# Patient Record
Sex: FEMALE | Race: WHITE | URBAN - METROPOLITAN AREA
[De-identification: names, ages, dates, MRNs, and addresses within clinical notes are randomized per-mention and may not be internally consistent; named-entity substitution may affect disease eponyms.]

---

## 2017-11-16 ENCOUNTER — OFFICE VISIT (OUTPATIENT)
Dept: PEDIATRICS | Facility: CLINIC | Age: 22
End: 2017-11-16
Payer: COMMERCIAL

## 2017-11-16 VITALS
OXYGEN SATURATION: 98 % | TEMPERATURE: 98.7 F | HEART RATE: 94 BPM | DIASTOLIC BLOOD PRESSURE: 72 MMHG | HEIGHT: 67 IN | BODY MASS INDEX: 28.56 KG/M2 | SYSTOLIC BLOOD PRESSURE: 116 MMHG | WEIGHT: 182 LBS

## 2017-11-16 DIAGNOSIS — Z23 NEED FOR PROPHYLACTIC VACCINATION WITH TETANUS-DIPHTHERIA (TD): ICD-10-CM

## 2017-11-16 DIAGNOSIS — Z23 NEED FOR PROPHYLACTIC VACCINATION AND INOCULATION AGAINST INFLUENZA: ICD-10-CM

## 2017-11-16 DIAGNOSIS — F41.1 GAD (GENERALIZED ANXIETY DISORDER): Primary | ICD-10-CM

## 2017-11-16 DIAGNOSIS — Z30.41 ENCOUNTER FOR BIRTH CONTROL PILLS MAINTENANCE: ICD-10-CM

## 2017-11-16 PROCEDURE — 90472 IMMUNIZATION ADMIN EACH ADD: CPT | Performed by: STUDENT IN AN ORGANIZED HEALTH CARE EDUCATION/TRAINING PROGRAM

## 2017-11-16 PROCEDURE — 90715 TDAP VACCINE 7 YRS/> IM: CPT | Performed by: STUDENT IN AN ORGANIZED HEALTH CARE EDUCATION/TRAINING PROGRAM

## 2017-11-16 PROCEDURE — 90471 IMMUNIZATION ADMIN: CPT | Performed by: STUDENT IN AN ORGANIZED HEALTH CARE EDUCATION/TRAINING PROGRAM

## 2017-11-16 PROCEDURE — 99214 OFFICE O/P EST MOD 30 MIN: CPT | Mod: GC | Performed by: STUDENT IN AN ORGANIZED HEALTH CARE EDUCATION/TRAINING PROGRAM

## 2017-11-16 PROCEDURE — 90686 IIV4 VACC NO PRSV 0.5 ML IM: CPT | Performed by: STUDENT IN AN ORGANIZED HEALTH CARE EDUCATION/TRAINING PROGRAM

## 2017-11-16 RX ORDER — LEVONORGESTREL/ETHIN.ESTRADIOL 0.1-0.02MG
1 TABLET ORAL DAILY
COMMUNITY

## 2017-11-16 RX ORDER — PAROXETINE 20 MG/1
20 TABLET, FILM COATED ORAL AT BEDTIME
Qty: 30 TABLET | Refills: 5 | Status: SHIPPED | OUTPATIENT
Start: 2017-11-16 | End: 2018-03-29

## 2017-11-16 RX ORDER — PAROXETINE 20 MG/1
20 TABLET, FILM COATED ORAL AT BEDTIME
COMMUNITY
End: 2017-11-16

## 2017-11-16 ASSESSMENT — PATIENT HEALTH QUESTIONNAIRE - PHQ9: SUM OF ALL RESPONSES TO PHQ QUESTIONS 1-9: 7

## 2017-11-16 ASSESSMENT — ANXIETY QUESTIONNAIRES
7. FEELING AFRAID AS IF SOMETHING AWFUL MIGHT HAPPEN: MORE THAN HALF THE DAYS
5. BEING SO RESTLESS THAT IT IS HARD TO SIT STILL: NOT AT ALL
IF YOU CHECKED OFF ANY PROBLEMS ON THIS QUESTIONNAIRE, HOW DIFFICULT HAVE THESE PROBLEMS MADE IT FOR YOU TO DO YOUR WORK, TAKE CARE OF THINGS AT HOME, OR GET ALONG WITH OTHER PEOPLE: SOMEWHAT DIFFICULT
3. WORRYING TOO MUCH ABOUT DIFFERENT THINGS: SEVERAL DAYS
GAD7 TOTAL SCORE: 7
4. TROUBLE RELAXING: NOT AT ALL
2. NOT BEING ABLE TO STOP OR CONTROL WORRYING: SEVERAL DAYS
1. FEELING NERVOUS, ANXIOUS, OR ON EDGE: SEVERAL DAYS
6. BECOMING EASILY ANNOYED OR IRRITABLE: MORE THAN HALF THE DAYS

## 2017-11-16 NOTE — PATIENT INSTRUCTIONS
Thank you for coming to clinic today. It was a pleasure to see you and I would be happy to see you back at any time for follow up or for new health issues.    1. I refilled your Paxil today. I would love to see you back in 6 months to reevaluate how the medication is working for you.  2. Make an appointment with Maribel Bailey for IUD placement and a pap smear. She's great!  3. Vaccines: Tdap and flu shots today.  4. Return to clinic in 6 months. We can do your physical exam then.     Preventive Health Recommendations  Female Ages 18 to 25     Yearly exam:     See your health care provider every year in order to  o Review health changes.   o Discuss preventive care.    o Review your medicines if your doctor has prescribed any.      You should be tested each year for STDs (sexually transmitted diseases).       After age 20, talk to your provider about how often you should have cholesterol testing.      Starting at age 21, get a Pap test every three years. If you have an abnormal result, your doctor may have you test more often.      If you are at risk for diabetes, you should have a diabetes test (fasting glucose).     Shots:     Get a flu shot each year.     Get a tetanus shot every 10 years.     Consider getting the shot (vaccine) that prevents cervical cancer (Gardasil).    Nutrition:     Eat at least 5 servings of fruits and vegetables each day.    Eat whole-grain bread, whole-wheat pasta and brown rice instead of white grains and rice.    Talk to your provider about Calcium and Vitamin D.     Lifestyle    Exercise at least 150 minutes a week each week (30 minutes a day, 5 days a week). This will help you control your weight and prevent disease.    Limit alcohol to one drink per day.    No smoking.     Wear sunscreen to prevent skin cancer.    See your dentist every six months for an exam and cleaning.

## 2017-11-16 NOTE — MR AVS SNAPSHOT
After Visit Summary   11/16/2017    Jeri Ray    MRN: [id number]           Patient Information     Date Of Birth          1995        Visit Information        Provider Department      11/16/2017 1:30 PM Trenton Cross MD [facility]        Today's Diagnoses     LENNY (generalized anxiety disorder)    -  1    Screening examination for venereal disease        Screening for malignant neoplasm of cervix        Need for HPV vaccine        Need for prophylactic vaccination and inoculation against influenza        Need for prophylactic vaccination with tetanus-diphtheria (TD)          Care Instructions    Thank you for coming to clinic today. It was a pleasure to see you and I would be happy to see you back at any time for follow up or for new health issues.    1. I refilled your Paxil today. I would love to see you back in 6 months to reevaluate how the medication is working for you.  2. Make an appointment with Maribel Bailey for IUD placement and a pap smear. She's great!  3. Vaccines: Tdap and flu shots today.  4. Return to clinic in 6 months. We can do your physical exam then.     Preventive Health Recommendations  Female Ages 18 to 25     Yearly exam:     See your health care provider every year in order to  o Review health changes.   o Discuss preventive care.    o Review your medicines if your doctor has prescribed any.      You should be tested each year for STDs (sexually transmitted diseases).       After age 20, talk to your provider about how often you should have cholesterol testing.      Starting at age 21, get a Pap test every three years. If you have an abnormal result, your doctor may have you test more often.      If you are at risk for diabetes, you should have a diabetes test (fasting glucose).     Shots:     Get a flu shot each year.     Get a tetanus shot every 10 years.     Consider getting the shot (vaccine) that prevents cervical cancer  "(Gardasil).    Nutrition:     Eat at least 5 servings of fruits and vegetables each day.    Eat whole-grain bread, whole-wheat pasta and brown rice instead of white grains and rice.    Talk to your provider about Calcium and Vitamin D.     Lifestyle    Exercise at least 150 minutes a week each week (30 minutes a day, 5 days a week). This will help you control your weight and prevent disease.    Limit alcohol to one drink per day.    No smoking.     Wear sunscreen to prevent skin cancer.    See your dentist every six months for an exam and cleaning.          Follow-ups after your visit        Who to contact     If you have questions or need follow up information about today's clinic visit or your schedule please contact [facility] directly at [phone].  Normal or non-critical lab and imaging results will be communicated to you by MyChart, letter or phone within 4 business days after the clinic has received the results. If you do not hear from us within 7 days, please contact the clinic through [employer] or phone. If you have a critical or abnormal lab result, we will notify you by phone as soon as possible.  Submit refill requests through [employer] or call your pharmacy and they will forward the refill request to us. Please allow 3 business days for your refill to be completed.          Additional Information About Your Visit        [employer] Information     [employer] lets you send messages to your doctor, view your test results, renew your prescriptions, schedule appointments and more. To sign up, go to [email or web address] . Click on \"Log in\" on the left side of the screen, which will take you to the Welcome page. Then click on \"Sign up Now\" on the right side of the page.     You will be asked to enter the access code listed below, as well as some personal information. Please follow the directions to create your username and password.     Your access code is: [email or web address]  Expires: 2/14/2018  2:22 PM   " "  Your access code will  in 90 days. If you need help or a new code, please call your [place] clinic or [phone].        Care EveryWhere ID     This is your Care EveryWhere ID. This could be used by other organizations to access your [place] medical records  [id number]        Your Vitals Were     Pulse Temperature Height Last Period Pulse Oximetry Breastfeeding?    94 98.7  F (37.1  C) (Oral) 5' 7\" (1.702 m) 2017 (Approximate) 98% No    BMI (Body Mass Index)                   28.51 kg/m2            Blood Pressure from Last 3 Encounters:   17 116/72    Weight from Last 3 Encounters:   17 182 lb (82.6 kg)              Today, you had the following     No orders found for display         Where to get your medicines      These medications were sent to [facility], [place], [facility], [place] - [address]  [address], [facility] [address]     Phone:  [phone]     PARoxetine 20 MG tablet          Primary Care Provider    Physician No Ref-Primary       NO REF-PRIMARY PHYSICIAN        Equal Access to Services     [facility] : Hadii aad ku hadasho Soomaali, waaxda luqadaha, qaybta kaalmada ting, isaura gambino . So [facility] [phone].    ATENCIÓN: Si habla español, tiene a ann disposición servicios gratuitos de asistencia lingüística. [facility] [phone].    We comply with applicable federal civil rights laws and [place] laws. We do not discriminate on the basis of race, color, national origin, age, disability, sex, sexual orientation, or gender identity.            Thank you!     Thank you for choosing [facility]  for your care. Our goal is always to provide you with excellent care. Hearing back from our patients is one way we can continue to improve our services. Please take a few minutes to complete the written survey that you may receive in the mail after your visit with us. Thank you!   "           Your Updated Medication List - Protect others around you: Learn how to safely use, store and throw away your medicines at www.disposemymeds.org.          This list is accurate as of: 11/16/17  2:22 PM.  Always use your most recent med list.                   Brand Name Dispense Instructions for use Diagnosis    LUTERA 0.1-20 MG-MCG per tablet   Generic drug:  levonorgestrel-ethinyl estradiol      Take 1 tablet by mouth daily        PARoxetine 20 MG tablet    PAXIL    30 tablet    Take 1 tablet (20 mg) by mouth At Bedtime    LENNY (generalized anxiety disorder)

## 2017-11-16 NOTE — NURSING NOTE
Injectable Influenza Immunization Documentation    1.  Is the person to be vaccinated sick today? No    2. Does the person to be vaccinated have an allergy to a component   of the vaccine? No    3. Has the person to be vaccinated ever had a serious reaction   to influenza vaccine in the past? No    4. Has the person to be vaccinated ever had Guillain-Barré syndrome? No    Form completed by Kezia WITT MA    Screening Questionnaire for Adult Immunization    Are you sick today?   No   Do you have allergies to medications, food, a vaccine component or latex?   No   Have you ever had a serious reaction after receiving a vaccination?   No   Do you have a long-term health problem with heart disease, lung disease, asthma, kidney disease, metabolic disease (e.g. diabetes), anemia, or other blood disorder?   No   Do you have cancer, leukemia, HIV/AIDS, or any other immune system problem?   No   In the past 3 months, have you taken medications that affect  your immune system, such as prednisone, other steroids, or anticancer drugs; drugs for the treatment of rheumatoid arthritis, Crohn s disease, or psoriasis; or have you had radiation treatments?   No   Have you had a seizure, or a brain or other nervous system problem?   No   During the past year, have you received a transfusion of blood or blood     products, or been given immune (gamma) globulin or antiviral drug?   No   For women: Are you pregnant or is there a chance you could become        pregnant during the next month?   No   Have you received any vaccinations in the past 4 weeks?   No     Immunization questionnaire answers were all negative.        Per orders of Dr. Cross/Dr. Sosa, injection of Adacel and Flu given by Kezia Garcia. Pt tolerated well. Patient instructed to remain in clinic for 15 minutes afterwards, and to report any adverse reaction to me immediately.       Screening performed by Kezia Garcia on 11/16/2017 at 3:01 PM.

## 2017-11-16 NOTE — PROGRESS NOTES
SUBJECTIVE:   CC: eJri Ray is an 22 year old woman who presents to establish care and for medication refills.     HPI  Moved from [place], [place] a year ago for work. Here to establish care today.    1. Anxiety: On Paxil 20 mg daily for last year and feels it is working well. LENNY score is 7, PHQ score is 7 today. No previous to compare to in our system. No side effects from Paxil. No issues with breakthrough anxiety. Current stressors include new job and starting school again. Was seeing a counselor several years ago but did not feel it was helpful and is not interested in starting therapy again.    2. Birth control: On Lutera for last year. She is interested in getting an IUD. Sexually active with same male partner, with whom she moved to [place]. No concerns for STDs. They are monogamous. They do not use additional birth control. She has regular periods, lasting about 7 days. Heavy for 2 days. Uses 2-3 super tampons on heaviest day.     3. Immunizations: Needs Tdap and flu shots today. Thinks she had the HPV series.     PHQ-9 score:    PHQ-9 SCORE 11/16/2017   Total Score 7     LENNY-7 SCORE 11/16/2017   Total Score 7     Abuse: Current or Past(Physical, Sexual or Emotional)- No  Do you feel safe in your environment - Yes    Social History   Substance Use Topics     Smoking status: Never Smoker     Smokeless tobacco: Never Used     Alcohol use Yes      Comment: a few drinks twice per month     The patient does not drink >3 drinks per day nor >7 drinks per week.    Reviewed orders with patient.  Reviewed health maintenance and updated orders accordingly - Yes  BP Readings from Last 3 Encounters:   11/16/17 116/72    Wt Readings from Last 3 Encounters:   11/16/17 182 lb (82.6 kg)                  Patient Active Problem List   Diagnosis     LENNY (generalized anxiety disorder)     Past Surgical History:   Procedure Laterality Date     HC TOOTH EXTRACTION W/FORCEP         Social History   Substance Use Topics      "Smoking status: Never Smoker     Smokeless tobacco: Never Used     Alcohol use Yes      Comment: a few drinks twice per month     Family History   Problem Relation Age of Onset     Hypertension Mother      Breast Cancer Maternal Grandmother 50     Breast Cancer Maternal Aunt 40         Current Outpatient Prescriptions   Medication Sig Dispense Refill     levonorgestrel-ethinyl estradiol (LUTERA) 0.1-20 MG-MCG per tablet Take 1 tablet by mouth daily       PARoxetine (PAXIL) 20 MG tablet Take 1 tablet (20 mg) by mouth At Bedtime 30 tablet 5     [DISCONTINUED] PARoxetine (PAXIL) 20 MG tablet Take 20 mg by mouth At Bedtime        No Known Allergies        Mammogram not appropriate for this patient based on age.    Pertinent mammograms are reviewed under the imaging tab.  History of abnormal Pap smear: NO - age 21-29 PAP every 3 years recommended  Just turned age 22, has not had a pap smear yet.     Reviewed and updated as needed this visit by clinical staffTobacco  Allergies  Meds  Problems  Med Hx  Surg Hx  Fam Hx  Soc Hx        Review of Systems  C: NEGATIVE for fever, chills, change in weight  I: NEGATIVE for worrisome rashes, moles or lesions  E: NEGATIVE for vision changes or irritation  ENT: NEGATIVE for ear, mouth and throat problems  R: NEGATIVE for significant cough or SOB  B: NEGATIVE for masses, tenderness or discharge  CV: NEGATIVE for chest pain, palpitations or peripheral edema  GI: NEGATIVE for nausea, abdominal pain, heartburn, or change in bowel habits  : NEGATIVE for unusual urinary or vaginal symptoms. Periods are regular.  M: NEGATIVE for significant arthralgias or myalgia  N: NEGATIVE for weakness, dizziness or paresthesias  P: NEGATIVE for changes in mood or affect     OBJECTIVE:   /72 (BP Location: Right arm, Cuff Size: Adult Regular)  Pulse 94  Temp 98.7  F (37.1  C) (Oral)  Ht 5' 7\" (1.702 m)  Wt 182 lb (82.6 kg)  LMP 11/02/2017 (Approximate)  SpO2 98%  Breastfeeding? No  " BMI 28.51 kg/m2  Physical Exam  Physical Exam     General: awake, alert, in no acute distress  HEENT: NCAT, PERRL, EOMI, sclera anicteric, no nasal discharge, MMM, posterior pharynx without erythema or exudates, no cervical lymphadenopathy  CV: RRR, no murmurs noted, peripheral pulses strong  Resp: CTAB, no increased WOB  Abd: Soft, nontender, nondistended, +BS, no rebound or guarding  MSK: No peripheral edema, extremities warm and well perfused, normal pulses  Skin: warm, dry, no jaundice  Neuro: CN II-XII grossly intact. No focal deficits. Alert and oriented x4.      ASSESSMENT/PLAN:   1. LENNY (generalized anxiety disorder)  PHQ and LENNY scores of 7 today. Stable on Paxil for last year per patient, with no side effects.   - PARoxetine (PAXIL) 20 MG tablet; Take 1 tablet (20 mg) by mouth At Bedtime  Dispense: 30 tablet; Refill: 5  - Should RTC in 6 months for medication evaluation.    2. Encounter for birth control pills maintenance  Currently on Lutera. She is interested in getting an IUD.   - Referred to Maribel Bailey for IUD placement.  - Should have her first pap smear at the same time.  - Not interested in STI screening at this time. Low risk.    3. Need for prophylactic vaccination and inoculation against influenza  - Vaccine Administration, Initial [61782]  - FLU VAC, SPLIT VIRUS IM > 3 YO (QUADRIVALENT) [22544]    4. Need for prophylactic vaccination with tetanus-diphtheria (TD)  - Vaccine Administration, Each Additional [66298]  - TDAP VACCINE (ADACEL)    Patient was seen and discussed with attending, Dr. Reji Sosa, who agrees with the above assessment and plan.    Rajwinder Cross MD  PGY - 2   Internal Medicine/Pediatrics  Pager [phone]  [facility]    I have seen this patient and examined him in the presence of Dr. Cross.  I was present during the key components of the presenting complaints, physical exam, diagnosis, and plan, and fully concur with the plan as listed in the  resident's note.

## 2017-11-16 NOTE — NURSING NOTE
"Chief Complaint   Patient presents with     Establish Care     Physical       Initial /72 (BP Location: Right arm, Cuff Size: Adult Regular)  Pulse 94  Temp 98.7  F (37.1  C) (Oral)  Ht 5' 7\" (1.702 m)  Wt 182 lb (82.6 kg)  SpO2 98%  Breastfeeding? No  BMI 28.51 kg/m2 Estimated body mass index is 28.51 kg/(m^2) as calculated from the following:    Height as of this encounter: 5' 7\" (1.702 m).    Weight as of this encounter: 182 lb (82.6 kg).  Medication Reconciliation: complete     Kezia Garcia MA   November 16, 2017,  1:51 PM    "

## 2017-11-17 ASSESSMENT — ANXIETY QUESTIONNAIRES: GAD7 TOTAL SCORE: 7

## 2018-01-28 ENCOUNTER — HEALTH MAINTENANCE LETTER (OUTPATIENT)
Age: 23
End: 2018-01-28

## 2018-03-27 ENCOUNTER — OFFICE VISIT (OUTPATIENT)
Dept: PEDIATRICS | Facility: CLINIC | Age: 23
End: 2018-03-27
Payer: COMMERCIAL

## 2018-03-27 VITALS
OXYGEN SATURATION: 96 % | HEART RATE: 97 BPM | BODY MASS INDEX: 31.23 KG/M2 | TEMPERATURE: 98.4 F | SYSTOLIC BLOOD PRESSURE: 126 MMHG | DIASTOLIC BLOOD PRESSURE: 80 MMHG | WEIGHT: 199 LBS | HEIGHT: 67 IN

## 2018-03-27 DIAGNOSIS — S81.802A WOUND OF LEFT LOWER EXTREMITY, INITIAL ENCOUNTER: Primary | ICD-10-CM

## 2018-03-27 PROCEDURE — 99214 OFFICE O/P EST MOD 30 MIN: CPT | Performed by: PHYSICIAN ASSISTANT

## 2018-03-27 PROCEDURE — 87070 CULTURE OTHR SPECIMN AEROBIC: CPT | Performed by: PHYSICIAN ASSISTANT

## 2018-03-27 PROCEDURE — 87077 CULTURE AEROBIC IDENTIFY: CPT | Performed by: PHYSICIAN ASSISTANT

## 2018-03-27 RX ORDER — SULFAMETHOXAZOLE/TRIMETHOPRIM 800-160 MG
1 TABLET ORAL 2 TIMES DAILY
Qty: 20 TABLET | Refills: 0 | Status: SHIPPED | OUTPATIENT
Start: 2018-03-27 | End: 2018-07-03

## 2018-03-27 NOTE — MR AVS SNAPSHOT
"              After Visit Summary   3/27/2018    Jeri Ray    MRN: [id number]           Patient Information     Date Of Birth          1995        Visit Information        Provider Department      3/27/2018 1:10 PM Julio Menendez PA-C [facility]        Today's Diagnoses     Wound of left lower extremity, initial encounter    -  1      Care Instructions    1. Heat at the site 10-15 min three times daily   2. Begin antibiotics   3. Keep covered   4. Return in two days--Dr. Purdy 330 pm on THU             Follow-ups after your visit        Who to contact     If you have questions or need follow up information about today's clinic visit or your schedule please contact [facility] directly at [phone].  Normal or non-critical lab and imaging results will be communicated to you by [employer], letter or phone within 4 business days after the clinic has received the results. If you do not hear from us within 7 days, please contact the clinic through [employer] or phone. If you have a critical or abnormal lab result, we will notify you by phone as soon as possible.  Submit refill requests through [employer] or call your pharmacy and they will forward the refill request to us. Please allow 3 business days for your refill to be completed.          Additional Information About Your Visit        [employer] Information     [employer] lets you send messages to your doctor, view your test results, renew your prescriptions, schedule appointments and more. To sign up, go to [email or web address] . Click on \"Log in\" on the left side of the screen, which will take you to the Welcome page. Then click on \"Sign up Now\" on the right side of the page.     You will be asked to enter the access code listed below, as well as some personal information. Please follow the directions to create your username and password.     Your access code is: [email or web address]  Expires: 6/25/2018  1:39 PM     Your access code will " " in 90 days. If you need help or a new code, please call your [place] clinic or [phone].        Care EveryWhere ID     This is your Care EveryWhere ID. This could be used by other organizations to access your [place] medical records  [id number]        Your Vitals Were     Pulse Temperature Height Pulse Oximetry BMI (Body Mass Index)       97 98.4  F (36.9  C) (Oral) 5' 7\" (1.702 m) 96% 31.17 kg/m2        Blood Pressure from Last 3 Encounters:   18 126/80   17 116/72    Weight from Last 3 Encounters:   18 199 lb (90.3 kg)   17 182 lb (82.6 kg)              We Performed the Following     Wound Culture Aerobic Bacterial          Today's Medication Changes          These changes are accurate as of 3/27/18  1:40 PM.  If you have any questions, ask your nurse or doctor.               Start taking these medicines.        Dose/Directions    sulfamethoxazole-trimethoprim 800-160 MG per tablet   Commonly known as:  BACTRIM DS/SEPTRA DS   Used for:  Wound of left lower extremity, initial encounter   Started by:  Julio Menendez PA-C        Dose:  1 tablet   Take 1 tablet by mouth 2 times daily   Quantity:  20 tablet   Refills:  0            Where to get your medicines      [facility] [place] [facility] [place], [place] - [address]  [address], [facility] [address]     Phone:  [phone]     sulfamethoxazole-trimethoprim 800-160 MG per tablet                Primary Care Provider Office Phone # Fax #    Enmo Danuta Cross [phone]       [address]  [facility] [address]        Equal Access to Services     FRANCESCA YANES AH: Rosario Vasquez, giana adams, norman maguire, isaura Naqvi [facility] [phone].    ATENCIÓN: Si habla español, tiene a ann disposición servicios gratuitos de asistencia lingüística. " Sarai loco [phone].    We comply with applicable federal civil rights laws and [place] laws. We do not discriminate on the basis of race, color, national origin, age, disability, sex, sexual orientation, or gender identity.            Thank you!     Thank you for choosing [facility]  for your care. Our goal is always to provide you with excellent care. Hearing back from our patients is one way we can continue to improve our services. Please take a few minutes to complete the written survey that you may receive in the mail after your visit with us. Thank you!             Your Updated Medication List - Protect others around you: Learn how to safely use, store and throw away your medicines at www.disposemymeds.org.          This list is accurate as of 3/27/18  1:40 PM.  Always use your most recent med list.                   Brand Name Dispense Instructions for use Diagnosis    LUTERA 0.1-20 MG-MCG per tablet   Generic drug:  levonorgestrel-ethinyl estradiol      Take 1 tablet by mouth daily        PARoxetine 20 MG tablet    PAXIL    30 tablet    Take 1 tablet (20 mg) by mouth At Bedtime    LENNY (generalized anxiety disorder)       sulfamethoxazole-trimethoprim 800-160 MG per tablet    BACTRIM DS/SEPTRA DS    20 tablet    Take 1 tablet by mouth 2 times daily    Wound of left lower extremity, initial encounter

## 2018-03-27 NOTE — PROGRESS NOTES
"  SUBJECTIVE:   Jeri Ray is a 22 year old female who presents to clinic today for the following health issues:    Concern - left thigh  Onset: 3 days ago    Description:   Two lacerations to left thigh  Deep; significant bleeding  Self inflicted with a blade    Intensity: moderate    Progression of Symptoms:  worsening    Accompanying Signs & Symptoms:  Red, hot to touch, clear drainage, pain x this morning  No streaking  No fevers, chills    Precipitating factors:   Worsened by: The next day, patient put superglue at site.  Tetanus updated in 2017.     Alleviating factors:  Improved by: none  Therapies Tried and outcome: none    Patient has hurt self in past. She continues on paxil.  Patient was drinking and watching a show alone. Show had traumatic scenes. New boxcutting knife from boyfriend.  No plans to harm self again.     ROS:  ROS otherwise negative    OBJECTIVE:                                                    /80 (BP Location: Right arm, Cuff Size: Adult Regular)  Pulse 97  Temp 98.4  F (36.9  C) (Oral)  Ht 5' 7\" (1.702 m)  Wt 199 lb (90.3 kg)  SpO2 96%  BMI 31.17 kg/m2  Body mass index is 31.17 kg/(m^2).   GENERAL: alert, no distress  SKIN: inspection of the proximal left anterior thigh reveals two horizontal lacerations measuring 3-3.5 inches. There is superglue present in the wound. Some openings from both sites and able to express discharge and culture. Diffuse erythema 2 inches around each site. Mild swelling, tenderness and warmth.   Psych: tearful and regretful    Diagnostic test results:  Results for orders placed or performed in visit on 03/27/18 (from the past 24 hour(s))   Wound Culture Aerobic Bacterial   Result Value Ref Range    Specimen Description Left Thigh     Special Requests Specimen collected in eSwab transport (white cap)     Culture Micro PENDING         ASSESSMENT/PLAN:                                                    (J94.409B) Wound of left lower extremity, " initial encounter  (primary encounter diagnosis)  Comment: culture obtained and pending. Patient placed on antibiotics. Superglue should slough off in own time. Patient to monitor redness closely. Signs for emergent evaluation discussed with patient.  Close follow up in two days.   Plan: Wound Culture Aerobic Bacterial,         sulfamethoxazole-trimethoprim (BACTRIM         DS/SEPTRA DS) 800-160 MG per tablet            (Z72.89) Self-inflicted injury  Comment: discussed briefly as this was my first time meeting patient. She will address more thoroughly with PMD in two days.   Plan:       See Patient Instructions    Julio Menendez PA-C  [facility]

## 2018-03-27 NOTE — Clinical Note
Cristiane Cross,  Just wanted to give you a heads up on this patient. I didn't delve into a conversation on her mental health and her self inflicted injury. She seemed regretful and possibly a one time episode. She seemed to like you and told her to discuss with you further. She'll also be following up her wounds as well.  Thanks! Marta

## 2018-03-27 NOTE — PATIENT INSTRUCTIONS
1. Heat at the site 10-15 min three times daily   2. Begin antibiotics   3. Keep covered   4. Return in two days--Dr. Purdy 330 pm on THU

## 2018-03-29 ENCOUNTER — OFFICE VISIT (OUTPATIENT)
Dept: PEDIATRICS | Facility: CLINIC | Age: 23
End: 2018-03-29
Payer: COMMERCIAL

## 2018-03-29 VITALS
HEIGHT: 67 IN | HEART RATE: 69 BPM | TEMPERATURE: 98.7 F | DIASTOLIC BLOOD PRESSURE: 80 MMHG | BODY MASS INDEX: 30.86 KG/M2 | OXYGEN SATURATION: 98 % | WEIGHT: 196.6 LBS | SYSTOLIC BLOOD PRESSURE: 110 MMHG

## 2018-03-29 DIAGNOSIS — F41.1 GAD (GENERALIZED ANXIETY DISORDER): Primary | ICD-10-CM

## 2018-03-29 LAB
BACTERIA SPEC CULT: ABNORMAL
Lab: ABNORMAL
SPECIMEN SOURCE: ABNORMAL

## 2018-03-29 PROCEDURE — 99214 OFFICE O/P EST MOD 30 MIN: CPT | Mod: GC | Performed by: STUDENT IN AN ORGANIZED HEALTH CARE EDUCATION/TRAINING PROGRAM

## 2018-03-29 RX ORDER — PAROXETINE 20 MG/1
40 TABLET, FILM COATED ORAL AT BEDTIME
Qty: 30 TABLET | Refills: 1 | Status: SHIPPED | OUTPATIENT
Start: 2018-03-29

## 2018-03-29 ASSESSMENT — ANXIETY QUESTIONNAIRES
GAD7 TOTAL SCORE: 7
4. TROUBLE RELAXING: NOT AT ALL
IF YOU CHECKED OFF ANY PROBLEMS ON THIS QUESTIONNAIRE, HOW DIFFICULT HAVE THESE PROBLEMS MADE IT FOR YOU TO DO YOUR WORK, TAKE CARE OF THINGS AT HOME, OR GET ALONG WITH OTHER PEOPLE: SOMEWHAT DIFFICULT
3. WORRYING TOO MUCH ABOUT DIFFERENT THINGS: SEVERAL DAYS
7. FEELING AFRAID AS IF SOMETHING AWFUL MIGHT HAPPEN: MORE THAN HALF THE DAYS
6. BECOMING EASILY ANNOYED OR IRRITABLE: SEVERAL DAYS
5. BEING SO RESTLESS THAT IT IS HARD TO SIT STILL: NOT AT ALL
2. NOT BEING ABLE TO STOP OR CONTROL WORRYING: SEVERAL DAYS
1. FEELING NERVOUS, ANXIOUS, OR ON EDGE: MORE THAN HALF THE DAYS

## 2018-03-29 NOTE — PATIENT INSTRUCTIONS
"Thank you for coming to clinic today. It was a pleasure to see you and I would be happy to see you back at any time for follow up or for new health issues.    1. Lacerations  - Continue Bactrim, finish up 10 day course.  If you have side effects including a skin rash, stop the medication right away and call us.  - Keep clean and dry.    2. Anxiety  - Increase Paxil to 30 mg daily for 1 week, then 40 mg daily.  - Come back to see me in 1 month - I have openings on 4/19 or 5/3!  - Recommend \"Full Catastrophe Living\" by Vasile Steel.  - Consider counseling - I've giving you a list of resources.    3. Pap - you need a pap smear, would be ideal to do with your IUD but if you decide against an IUD with Maribel Bailey, then just come back for a visit for a pap smear.    Thanks for coming in today. Let me know how else I can help!    Rajwinder Cross MD  "

## 2018-03-29 NOTE — MR AVS SNAPSHOT
"              After Visit Summary   3/29/2018    Jeri Ray    MRN: [id number]           Patient Information     Date Of Birth          1995        Visit Information        Provider Department      3/29/2018 3:30 PM Trenton Cross MD [facility]        Today's Diagnoses     LENNY (generalized anxiety disorder)          Care Instructions    Thank you for coming to clinic today. It was a pleasure to see you and I would be happy to see you back at any time for follow up or for new health issues.    1. Lacerations  - Continue Bactrim, finish up 10 day course.  If you have side effects including a skin rash, stop the medication right away and call us.  - Keep clean and dry.    2. Anxiety  - Increase Paxil to 30 mg daily for 1 week, then 40 mg daily.  - Come back to see me in 1 month - I have openings on 4/19 or 5/3!  - Recommend \"Full Catastrophe Living\" by Vasile Steel.  - Consider counseling - I've giving you a list of resources.    3. Pap - you need a pap smear, would be ideal to do with your IUD but if you decide against an IUD with Maribel Bailey, then just come back for a visit for a pap smear.    Thanks for coming in today. Let me know how else I can help!    Rajwinder Cross MD          Follow-ups after your visit        Follow-up notes from your care team     Return in about 5 weeks (around 5/3/2018) for f/up 4/19 or 5/3/18.      Who to contact     If you have questions or need follow up information about today's clinic visit or your schedule please contact [facility] directly at [phone].  Normal or non-critical lab and imaging results will be communicated to you by MyChart, letter or phone within 4 business days after the clinic has received the results. If you do not hear from us within 7 days, please contact the clinic through MyChart or phone. If you have a critical or abnormal lab result, we will notify you by phone as soon as possible.  Submit refill requests through " "[employer]arcus or call your pharmacy and they will forward the refill request to us. Please allow 3 business days for your refill to be completed.          Additional Information About Your Visit        MyChart Information     [employer] lets you send messages to your doctor, view your test results, renew your prescriptions, schedule appointments and more. To sign up, go to [email or web address] . Click on \"Log in\" on the left side of the screen, which will take you to the Welcome page. Then click on \"Sign up Now\" on the right side of the page.     You will be asked to enter the access code listed below, as well as some personal information. Please follow the directions to create your username and password.     Your access code is: [email or web address]  Expires: 2018  1:39 PM     Your access code will  in 90 days. If you need help or a new code, please call your [place] clinic or [phone].        Care EveryWhere ID     This is your Care EveryWhere ID. This could be used by other organizations to access your [place] medical records  [id number]        Your Vitals Were     Pulse Temperature Height Pulse Oximetry BMI (Body Mass Index)       69 98.7  F (37.1  C) (Oral) 5' 7\" (1.702 m) 98% 30.79 kg/m2        Blood Pressure from Last 3 Encounters:   18 110/80   18 126/80   17 116/72    Weight from Last 3 Encounters:   18 196 lb 9.6 oz (89.2 kg)   18 199 lb (90.3 kg)   17 182 lb (82.6 kg)              Today, you had the following     No orders found for display         Today's Medication Changes          These changes are accurate as of 3/29/18  4:24 PM.  If you have any questions, ask your nurse or doctor.               These medicines have changed or have updated prescriptions.        Dose/Directions    PARoxetine 20 MG tablet   Commonly known as:  PAXIL   This may have changed:  how much to take   Used for:  LENNY (generalized anxiety disorder)   Changed by:  Trenton Cross MD     "    Dose:  40 mg   Take 2 tablets (40 mg) by mouth At Bedtime   Quantity:  30 tablet   Refills:  1            Where to get your medicines      These medications were sent to [facility], [place], [place] - [place], [place] - [address]  [address], [facility] [address]     Phone:  [phone]     PARoxetine 20 MG tablet                Primary Care Provider Office Phone # Fax #    Trenton Traceterra MD Tay [phone]       [address]  [facility] [address]        Equal Access to Services     [facility] JOAQUÍN : Hadii aad ku hadasho Soomaali, waaxda luqadaha, qaybta kaalmada adeegyada, isaura gambino . So [facility] [phone].    ATENCIÓN: Si habla español, tiene a ann disposición servicios gratuitos de asistencia lingüística. [facility] [phone].    We comply with applicable federal civil rights laws and [place] laws. We do not discriminate on the basis of race, color, national origin, age, disability, sex, sexual orientation, or gender identity.            Thank you!     Thank you for choosing [facility]  for your care. Our goal is always to provide you with excellent care. Hearing back from our patients is one way we can continue to improve our services. Please take a few minutes to complete the written survey that you may receive in the mail after your visit with us. Thank you!             Your Updated Medication List - Protect others around you: Learn how to safely use, store and throw away your medicines at www.disposemymeds.org.          This list is accurate as of 3/29/18  4:24 PM.  Always use your most recent med list.                   Brand Name Dispense Instructions for use Diagnosis    LUTERA 0.1-20 MG-MCG per tablet   Generic drug:  levonorgestrel-ethinyl estradiol      Take 1 tablet by mouth daily        PARoxetine 20 MG tablet    PAXIL    30 tablet    Take 2 tablets (40 mg) by mouth At Bedtime     LENNY (generalized anxiety disorder)       sulfamethoxazole-trimethoprim 800-160 MG per tablet    BACTRIM DS/SEPTRA DS    20 tablet    Take 1 tablet by mouth 2 times daily    Wound of left lower extremity, initial encounter

## 2018-03-29 NOTE — PROGRESS NOTES
SUBJECTIVE:   Jeri Ray is a 22 year old female who presents to clinic today for the following health issues:      1. Anxiety Follow-Up    Status since last visit: No change    Other associated symptoms:self lacerations 3/27    Complicating factors:   Significant life event: No   Current substance abuse: None  Depression symptoms: yes    PHQ-9 SCORE 11/16/2017 3/29/2018   Total Score 7 9     LENNY-7 SCORE 11/16/2017 3/29/2018   Total Score 7 7       LENNY-7    Amount of exercise or physical activity: 4-5 days/week for an average of 30-45 minutes    Problems taking medications regularly: No    Medication side effects: none - doing well with Paxil    Diet: regular (no restrictions)    Patient feels that her anxiety is under control.  No new stressors.  Job, school, and relationship are going well.  She continues to take Paxil 20 mg daily with no side effects.  We discussed counseling today, she remains resistant to the idea.  Nose that she had had a therapist at school several years ago and did not find it helpful.  She feels that she has adequate stress and anxiety management techniques.  She denies any breakthrough anxiety.      2. Wound check : two laceration to left thigh, one shallow laceration to right thigh.  She was seen in clinic on March 27 for lacerations on her thighs that were self-inflicted.  She states that she had been out drinking with her friends, and when she came home she started watching a TV show by herself.  In the TV show, there was a character cutting another character in a scene, and patient decided to reach for her boyfriend's boxcutting knife and cut herself on her thigh.  She denied that the intent was to kill herself or even to hurt herself.  She was under the influence of alcohol at the time, and was embarrassed to admit that she thought it would be fun to do.  She reports a history of cutting, she has not cut in the last 2-3 years.  She used to cut on her arms with razor blades, but  has never cut so deep that she needed stitches.  The next morning when she woke up, she felt very guilty for what she had done.  The cuts were deep enough that she felt she needed stitches, she was embarrassed to go to the emergency room, so she used superglue on the lacerations.  The wounds started draining and more painful, so she was seen in clinic for this, and was started on Bactrim.  Culture from the wound grew CoNS. She states that she has no intention of cutting again and was very embarrassed by the event.  She denies homicidal or suicidal ideation.     Problem list and histories reviewed & adjusted, as indicated.  Additional history: as documented    Patient Active Problem List   Diagnosis     LENNY (generalized anxiety disorder)     Past Surgical History:   Procedure Laterality Date     HC TOOTH EXTRACTION W/FORCEP         Social History   Substance Use Topics     Smoking status: Never Smoker     Smokeless tobacco: Never Used     Alcohol use Yes      Comment: a few drinks twice per month     Family History   Problem Relation Age of Onset     Hypertension Mother      Breast Cancer Maternal Grandmother 50     Breast Cancer Maternal Aunt 40         Current Outpatient Prescriptions   Medication Sig Dispense Refill     PARoxetine (PAXIL) 20 MG tablet Take 2 tablets (40 mg) by mouth At Bedtime 30 tablet 1     sulfamethoxazole-trimethoprim (BACTRIM DS/SEPTRA DS) 800-160 MG per tablet Take 1 tablet by mouth 2 times daily 20 tablet 0     levonorgestrel-ethinyl estradiol (LUTERA) 0.1-20 MG-MCG per tablet Take 1 tablet by mouth daily       [DISCONTINUED] PARoxetine (PAXIL) 20 MG tablet Take 1 tablet (20 mg) by mouth At Bedtime 30 tablet 5     No Known Allergies  BP Readings from Last 3 Encounters:   03/29/18 110/80   03/27/18 126/80   11/16/17 116/72    Wt Readings from Last 3 Encounters:   03/29/18 196 lb 9.6 oz (89.2 kg)   03/27/18 199 lb (90.3 kg)   11/16/17 182 lb (82.6 kg)                    ROS:  CONSTITUTIONAL:  "NEGATIVE for fever, chills, change in weight  INTEGUMENTARY/SKIN: POSITIVE for wounds  EYES: NEGATIVE for vision changes or irritation  ENT/MOUTH: NEGATIVE for ear, mouth and throat problems  RESP: NEGATIVE for significant cough or SOB  BREAST: NEGATIVE for masses, tenderness or discharge  CV: NEGATIVE for chest pain, palpitations or peripheral edema  GI: NEGATIVE for nausea, abdominal pain, heartburn, or change in bowel habits  : NEGATIVE for frequency, dysuria, or hematuria  MUSCULOSKELETAL: NEGATIVE for significant arthralgias or myalgia  NEURO: NEGATIVE for weakness, dizziness or paresthesias  ENDOCRINE: NEGATIVE for temperature intolerance, skin/hair changes  HEME: NEGATIVE for bleeding problems  PSYCHIATRIC: NEGATIVE for changes in mood or affect    OBJECTIVE:     /80  Pulse 69  Temp 98.7  F (37.1  C) (Oral)  Ht 5' 7\" (1.702 m)  Wt 196 lb 9.6 oz (89.2 kg)  SpO2 98%  BMI 30.79 kg/m2  Body mass index is 30.79 kg/(m^2).    Physical Exam  General: awake, alert, in no acute distress  HEENT: NCAT, PERRL, EOMI, sclera anicteric, no nasal discharge, MMM, posterior pharynx without erythema or exudates, no cervical lymphadenopathy  CV: RRR, no murmurs noted, peripheral pulses strong  Resp: CTAB, no increased WOB  Abd: Soft, nontender, nondistended  MSK: No peripheral edema, extremities warm and well perfused, normal pulses  Skin: warm, dry, no jaundice. ~ 3 inch lacerations x2 on L anterior thigh. Superglue still present in the wound. No active purulent drainage. Several additional shallow lacerations distally. One ~3 inch shallow laceration on R anterior thigh. Erythema mild around lacerations.  Neuro: CN II-XII grossly intact. No focal deficits. Alert and oriented x4.      Diagnostic Test Results:  none     Wound Culture from 3/27  Culture Micro (Abnormal) 03/27/2018  1:30    Light growth   Coagulase negative Staphylococcus   Susceptibility testing not routinely done          ASSESSMENT/PLAN: " "    1. Self-inflicted injury  Lacerations appear to be healing.  No active purulent drainage, erythema, warmth, and pain have decreased from prior.  Will finish off the course of antibiotics.  Discussed return precautions.  Jeri and I discussed for some time about her history of self-inflicted injuries and about this current episode of self-inflicted injuries.  It sounds like this episode was because she was under the influence of alcohol, and she did not have intent to harm or kill herself.  She was very guilty and regretful after.  We discussed her alcohol use as well.  -Finish 10 day course of Bactrim, watch for side effects  -Keep clean and dry    2. LENNY (generalized anxiety disorder)  LENNY NPH q. are still elevated today.  Not significantly improved from last visit, despite the fact the patient feels that she is doing pretty well.  We discussed that she is still quite symptomatic and we can either increase the dose of Paxil or switch to a different medication.  We decided to increase her dose of Paxil to see if we can bring her symptoms under control.  We discussed again counseling, at this time patient still would not like to pursue this.  - PARoxetine (PAXIL) 20 MG tablet; Take 30 mg daily for 1 week, then increase to 40 mg daily.   Take 2 tablets (40 mg) by mouth At Bedtime  Dispense: 30 tablet; Refill: 1  - Given list of mental health resources.  Strongly recommended counseling.  - Recommend \"Full Catastrophe Living\" by Vasile Steel for reading  - Return to clinic in 1 month for follow up with me.  If symptoms remain about the same, should consider switching to a different SSRI/SNRI.      FUTURE APPOINTMENTS:       - Follow-up visit in 3-5 weeks.    Patient was seen and discussed with attending, Dr. Reji Sosa, who agrees with the above assessment and plan.    Rajwinder Cross MD  PGY - 2   Internal Medicine/Pediatrics  Pager [phone]  [facility]    I have seen this patient and " examined him in the presence of Dr. Cross.  I was present during the key components of the presenting complaints, physical exam, diagnosis, and plan, and fully concur with the plan as listed in the resident's note.

## 2018-03-30 ASSESSMENT — PATIENT HEALTH QUESTIONNAIRE - PHQ9: SUM OF ALL RESPONSES TO PHQ QUESTIONS 1-9: 9

## 2018-03-30 ASSESSMENT — ANXIETY QUESTIONNAIRES: GAD7 TOTAL SCORE: 7

## 2018-07-03 ENCOUNTER — OFFICE VISIT (OUTPATIENT)
Dept: FAMILY MEDICINE | Facility: CLINIC | Age: 23
End: 2018-07-03
Payer: COMMERCIAL

## 2018-07-03 VITALS
DIASTOLIC BLOOD PRESSURE: 76 MMHG | HEART RATE: 83 BPM | WEIGHT: 203.5 LBS | SYSTOLIC BLOOD PRESSURE: 120 MMHG | OXYGEN SATURATION: 98 % | HEIGHT: 67 IN | TEMPERATURE: 98.7 F | BODY MASS INDEX: 31.94 KG/M2 | RESPIRATION RATE: 17 BRPM
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
## 2018-07-03 DIAGNOSIS — S51.852A CAT BITE OF FOREARM, LEFT, INITIAL ENCOUNTER: ICD-10-CM

## 2018-07-03 DIAGNOSIS — F41.1 GAD (GENERALIZED ANXIETY DISORDER): Primary | ICD-10-CM

## 2018-07-03 PROCEDURE — 99214 OFFICE O/P EST MOD 30 MIN: CPT | Performed by: PHYSICIAN ASSISTANT

## 2018-07-03 NOTE — MR AVS SNAPSHOT
"              After Visit Summary   7/3/2018    Jeri Ray    MRN: [id number]           Patient Information     Date Of Birth          1995        Visit Information        Provider Department      7/3/2018 5:00 PM Lan Hager PA-C [facility]        Today's Diagnoses     LENNY (generalized anxiety disorder)    -  1    Cat bite of forearm, left, initial encounter          Care Instructions    If you cannot find out the vaccine status of the cat I strongly recommend that you seek rabies treatment at the [facility] ED within the next day or two and follow it up with the required immunizations.           Follow-ups after your visit        Follow-up notes from your care team     Return in about 2 months (around 9/3/2018) for Med check with pcp.      Who to contact     If you have questions or need follow up information about today's clinic visit or your schedule please contact [facility] directly at [phone].  Normal or non-critical lab and imaging results will be communicated to you by MyChart, letter or phone within 4 business days after the clinic has received the results. If you do not hear from us within 7 days, please contact the clinic through MyChart or phone. If you have a critical or abnormal lab result, we will notify you by phone as soon as possible.  Submit refill requests through [employer] or call your pharmacy and they will forward the refill request to us. Please allow 3 business days for your refill to be completed.          Additional Information About Your Visit        Care EveryWhere ID     This is your Care EveryWhere ID. This could be used by other organizations to access your [place] medical records  [id number]        Your Vitals Were     Pulse Temperature Respirations Height Pulse Oximetry BMI (Body Mass Index)    83 98.7  F (37.1  C) (Tympanic) 17 5' 7\" (1.702 m) 98% 31.87 kg/m2       Blood Pressure from Last 3 Encounters:   07/03/18 120/76 "   03/29/18 110/80   03/27/18 126/80    Weight from Last 3 Encounters:   07/03/18 203 lb 8 oz (92.3 kg)   03/29/18 196 lb 9.6 oz (89.2 kg)   03/27/18 199 lb (90.3 kg)              Today, you had the following     No orders found for display         Today's Medication Changes          These changes are accurate as of 7/3/18  6:07 PM.  If you have any questions, ask your nurse or doctor.               Start taking these medicines.        Dose/Directions    amoxicillin-clavulanate 875-125 MG per tablet   Commonly known as:  AUGMENTIN   Used for:  Cat bite of forearm, left, initial encounter   Started by:  Lan Hager PA-C        Dose:  1 tablet   Take 1 tablet by mouth 2 times daily   Quantity:  20 tablet   Refills:  0       FLUoxetine 20 MG capsule   Commonly known as:  PROzac   Used for:  LENNY (generalized anxiety disorder)   Started by:  Lan Hager PA-C        Dose:  20 mg   Take 1 capsule (20 mg) by mouth daily Ok to move to 2 capsules (40mg) daily after 2 weeks if tolerating   Quantity:  90 capsule   Refills:  0            Where to get your medicines      These medications were sent to [id number] - [place], [place] - [address]  [address] [address]     Phone:  [phone]     amoxicillin-clavulanate 875-125 MG per tablet    FLUoxetine 20 MG capsule                Primary Care Provider Office Phone # Fax #    Gregoriog Danuta Cross [phone]       [address] [place] [id number]        Equal Access to Services     JHON YANES AH: Hadii gloria edgaro Soisaiah, waaxda luqadaha, qaybta kaalmada adejuhi, isaura alvarez. So [facility] [phone].    ATENCIÓN: Si habla español, tiene a ann disposición servicios gratuitos de asistencia lingüística. Sarai al [phone].    We comply with applicable federal civil rights laws and [place] laws. We do not discriminate on the basis of race, color,  national origin, age, disability, sex, sexual orientation, or gender identity.            Thank you!     Thank you for choosing [facility]  for your care. Our goal is always to provide you with excellent care. Hearing back from our patients is one way we can continue to improve our services. Please take a few minutes to complete the written survey that you may receive in the mail after your visit with us. Thank you!             Your Updated Medication List - Protect others around you: Learn how to safely use, store and throw away your medicines at www.disposemymeds.org.          This list is accurate as of 7/3/18  6:07 PM.  Always use your most recent med list.                   Brand Name Dispense Instructions for use Diagnosis    amoxicillin-clavulanate 875-125 MG per tablet    AUGMENTIN    20 tablet    Take 1 tablet by mouth 2 times daily    Cat bite of forearm, left, initial encounter       FLUoxetine 20 MG capsule    PROzac    90 capsule    Take 1 capsule (20 mg) by mouth daily Ok to move to 2 capsules (40mg) daily after 2 weeks if tolerating    LENNY (generalized anxiety disorder)       LUTERA 0.1-20 MG-MCG per tablet   Generic drug:  levonorgestrel-ethinyl estradiol      Take 1 tablet by mouth daily        PARoxetine 20 MG tablet    PAXIL    30 tablet    Take 2 tablets (40 mg) by mouth At Bedtime    LENNY (generalized anxiety disorder)

## 2018-07-03 NOTE — PATIENT INSTRUCTIONS
If you cannot find out the vaccine status of the cat I strongly recommend that you seek rabies treatment at the [facility] ED within the next day or two and follow it up with the required immunizations.

## 2018-07-03 NOTE — PROGRESS NOTES
"  SUBJECTIVE:   Jeri Ray is a 23 year old female who presents to clinic today for the following health issues:    Medication Followup of Paxil     Taking Medication as prescribed: yes    Side Effects:  None    Medication Helping Symptoms:  yes   Patient presents to update medication for depression/anxiety  Notes being on paxil for the past year or two  Generally stable but off of medication for the past 3-4 days and feeling side effects  Increased emotionality, shaky, tearful  [place] controlled while on the medication but noting recent increased life stressors have bent this some      Cat Bite       Duration: Yesterday     Description (location/character/radiation): Patient was bit by a cat yesterday right below her left wrist.     Intensity:  moderate    Accompanying signs and symptoms: Redness, swelling     History (similar episodes/previous evaluation): None    Precipitating or alleviating factors: None    Therapies tried and outcome: Band-aide and warm compress - neither effective    -Bit yesterday by a cat while at open house  -she is unsure of the vaccination status of the cat   -was a \"random cat\"  -there are some scratches on both arms and bite wounds to the left distal forearm  -increasingly red and swollen; tender  -denies loss of ROM  -denies fevers, chills    Problem list and histories reviewed & adjusted, as indicated.  Additional history: as documented    Patient Active Problem List   Diagnosis     LENNY (generalized anxiety disorder)     Self-inflicted injury     Past Surgical History:   Procedure Laterality Date     HC TOOTH EXTRACTION W/FORCEP         Social History   Substance Use Topics     Smoking status: Never Smoker     Smokeless tobacco: Never Used     Alcohol use Yes      Comment: a few drinks twice per month     Family History   Problem Relation Age of Onset     Hypertension Mother      Breast Cancer Maternal Grandmother 50     Breast Cancer Maternal Aunt 40           Reviewed and updated as " "needed this visit by clinical staff       Reviewed and updated as needed this visit by Provider         ROS:  Constitutional, HEENT, cardiovascular, pulmonary, gi and gu systems are negative, except as otherwise noted.    OBJECTIVE:     /76 (BP Location: Right arm, Patient Position: Chair, Cuff Size: Adult Large)  Pulse 83  Temp 98.7  F (37.1  C) (Tympanic)  Resp 17  Ht 5' 7\" (1.702 m)  Wt 203 lb 8 oz (92.3 kg)  SpO2 98%  BMI 31.87 kg/m2  Body mass index is 31.87 kg/(m^2).  GENERAL: healthy, alert and no distress  MS: no gross musculoskeletal defects noted  SKIN: over the left ulnar forearm are 2 separate bite wounds with erythema and swelling. There is no streaking. Some warmth. Mild tenderness.  PSYCH: normal affect, pleasant; tearful    Diagnostic Test Results:  none     ASSESSMENT/PLAN:   1. LENNY (generalized anxiety disorder)  Controlled on paxil but with significant side effects if missing dose. Does want to stay on meds at this time but not likely paxil. We'll start prozac, especially as she has expressed a possible desire to be off at sometime over the next few months and this will be much easier to taper. She will plan to follow up with primary care provider in 1-2 months.   - FLUoxetine (PROZAC) 20 MG capsule; Take 1 capsule (20 mg) by mouth daily Ok to move to 2 capsules (40mg) daily after 2 weeks if tolerating  Dispense: 90 capsule; Refill: 0    2. Cat bite of forearm, left, initial encounter  Needs treatment with abx; she is up to date on tdap. I have encouraged her to seek care at the ED as well given that she does not know the vaccine status of this cat. She was hesitant and wanted to pursue identifying this information first. If she cannot she knows about rabies prophylaxis per our discussion today.   - amoxicillin-clavulanate (AUGMENTIN) 875-125 MG per tablet; Take 1 tablet by mouth 2 times daily  Dispense: 20 tablet; Refill: 0    Lan Hager PA-C  [facility] [place]  "

## 2018-08-19 ENCOUNTER — TELEPHONE (OUTPATIENT)
Dept: FAMILY MEDICINE | Facility: CLINIC | Age: 23
End: 2018-08-19

## 2018-08-19 DIAGNOSIS — F41.1 GAD (GENERALIZED ANXIETY DISORDER): ICD-10-CM

## 2018-08-20 NOTE — TELEPHONE ENCOUNTER
"Requested Prescriptions   Pending Prescriptions Disp Refills     FLUoxetine (PROZAC) 20 MG capsule [Pharmacy Med Name: FLUOXETINE HCL 20MG CAPS]  Last Written Prescription Date:  7/3/18  Last Fill Quantity: 90,  # refills: 0   Last office visit: 7/3/2018 with prescribing provider:  Lan Hager PA-C    Future Office Visit:     90 capsule 0     Sig: TAKE ONE CAPSULE BY MOUTH ONCE DAILY. INCREASE TO TWO CAPSULES DAILY AFTER 2 WEEKS IF TOLERATING DOSE    SSRIs Protocol Passed    8/19/2018  4:04 AM  PHQ-9 SCORE 11/16/2017 3/29/2018   Total Score 7 9     LENNY-7 SCORE 11/16/2017 3/29/2018   Total Score 7 7              Passed - Recent (12 mo) or future (30 days) visit within the authorizing provider's specialty    Patient had office visit in the last 12 months or has a visit in the next 30 days with authorizing provider or within the authorizing provider's specialty.  See \"Patient Info\" tab in inbasket, or \"Choose Columns\" in Meds & Orders section of the refill encounter.           Passed - Patient is age 18 or older       Passed - No active pregnancy on record       Passed - No positive pregnancy test in last 12 months          "

## 2018-08-21 NOTE — TELEPHONE ENCOUNTER
Called patient and informed that medication was filled but appt is needed before anymore refills. Patient stated that she will have to call back to schedule.   Deja Boyle MA

## 2018-08-21 NOTE — TELEPHONE ENCOUNTER
Prescription approved per [facility] [place].    Due for medication check in September.     Routing to [facility] Nurse [facility], please call to assist patient in scheduling a doctor's appointment.

## 2019-09-17 ENCOUNTER — OFFICE VISIT (OUTPATIENT)
Dept: OBGYN | Facility: CLINIC | Age: 24
End: 2019-09-17
Payer: COMMERCIAL

## 2019-09-17 VITALS
BODY MASS INDEX: 25.27 KG/M2 | HEIGHT: 67 IN | HEART RATE: 80 BPM | WEIGHT: 161 LBS | DIASTOLIC BLOOD PRESSURE: 64 MMHG | SYSTOLIC BLOOD PRESSURE: 112 MMHG

## 2019-09-17 DIAGNOSIS — Z12.4 CERVICAL CANCER SCREENING: ICD-10-CM

## 2019-09-17 DIAGNOSIS — Z01.419 WELL WOMAN EXAM WITH ROUTINE GYNECOLOGICAL EXAM: Primary | ICD-10-CM

## 2019-09-17 DIAGNOSIS — Z11.3 ROUTINE SCREENING FOR STI (SEXUALLY TRANSMITTED INFECTION): ICD-10-CM

## 2019-09-17 PROCEDURE — 87491 CHLMYD TRACH DNA AMP PROBE: CPT | Performed by: ADVANCED PRACTICE MIDWIFE

## 2019-09-17 PROCEDURE — G0123 SCREEN CERV/VAG THIN LAYER: HCPCS | Performed by: ADVANCED PRACTICE MIDWIFE

## 2019-09-17 PROCEDURE — 87591 N.GONORRHOEAE DNA AMP PROB: CPT | Performed by: ADVANCED PRACTICE MIDWIFE

## 2019-09-17 PROCEDURE — 99385 PREV VISIT NEW AGE 18-39: CPT | Performed by: ADVANCED PRACTICE MIDWIFE

## 2019-09-17 ASSESSMENT — MIFFLIN-ST. JEOR: SCORE: 1517.92

## 2019-09-17 NOTE — NURSING NOTE
"Chief Complaint   Patient presents with     Physical     Annual exam, pap and GC testing.        Initial /64   Pulse 80   Ht 1.702 m (5' 7\")   Wt 73 kg (161 lb)   LMP 2019 (Approximate)   Breastfeeding? No   BMI 25.22 kg/m   Estimated body mass index is 25.22 kg/m  as calculated from the following:    Height as of this encounter: 1.702 m (5' 7\").    Weight as of this encounter: 73 kg (161 lb).  BP completed using cuff size: regular    Questioned patient about current smoking habits.  Pt. has never smoked.          The following HM Due: pap smear and GC      The following patient reported/Care Every where data was sent to:  P ABSTRACT QUALITY INITIATIVES [18084]       patient has appointment for today. Chhaya Dennison LPN                "

## 2019-09-17 NOTE — LETTER
September 25, 2019      Jeri Ray  [address] [place] [address]    Dear ,      I am happy to inform you that your recent cervical cancer screening test (PAP smear) was normal.      Preventative screenings such as this help to ensure your health for years to come. You should repeat a pap smear in 3 years, unless otherwise directed.      You will still need to return to the clinic every year for your annual exam and other preventive tests.     If you have additional questions regarding this result, please call our registered nurse, Nena at [phone].      Sincerely,      CLAUDIO Sanders CNM/aden

## 2019-09-17 NOTE — PROGRESS NOTES
Jeri is a 24 year old No obstetric history on file. female who presents for annual exam.     Besides routine health maintenance, she has no other health concerns today .    HPI: Here for annual. No other health concerns. Using condoms for birth control. Did not take FIDELINA regularly. Interested in IUD.  Due for Pap has never had. Started HPV series unsure if she finished.     GYNECOLOGIC HISTORY:     Patient's last menstrual period was 09/09/2019 (approximate).    Her current contraception method is: condoms.  She  reports that she has never smoked. She has never used smokeless tobacco.    Patient is sexually active.  STD testing offered?  Accepted  Last PHQ-9 score on record =   PHQ-9 SCORE 3/29/2018   PHQ-9 Total Score 9     Last GAD7 score on record =   LENNY-7 SCORE 3/29/2018   Total Score 7     Alcohol Score = Occasional every week     HEALTH MAINTENANCE:  Cholesterol: (No results found for: CHOL   Last Mammo: NA , Result: not applicable  Pap: (No results found for: PAP ) first pap   Colonoscopy:  NA ,   Health maintenance updated:  yes    HISTORY:  OB History   No data available       Patient Active Problem List   Diagnosis     LENNY (generalized anxiety disorder)     Self-inflicted injury     Past Surgical History:   Procedure Laterality Date     HC TOOTH EXTRACTION W/FORCEP        Social History     Tobacco Use     Smoking status: Never Smoker     Smokeless tobacco: Never Used   Substance Use Topics     Alcohol use: Yes     Comment: a few drinks twice per month      Problem (# of Occurrences) Relation (Name,Age of Onset)    Breast Cancer (2) Maternal Grandmother (50), Maternal Aunt (40)    Hypertension (1) Mother            Current Outpatient Medications   Medication Sig     amoxicillin-clavulanate (AUGMENTIN) 875-125 MG per tablet Take 1 tablet by mouth 2 times daily     FLUoxetine (PROZAC) 20 MG capsule TAKE ONE CAPSULE BY MOUTH ONCE DAILY. INCREASE TO TWO CAPSULES DAILY AFTER 2 WEEKS IF TOLERATING DOSE      levonorgestrel-ethinyl estradiol (LUTERA) 0.1-20 MG-MCG per tablet Take 1 tablet by mouth daily     PARoxetine (PAXIL) 20 MG tablet Take 2 tablets (40 mg) by mouth At Bedtime     No current facility-administered medications for this visit.      No Known Allergies    Past medical, surgical, social and family histories were reviewed and updated in EPIC.    ROS:   12 point review of systems negative other than symptoms noted below.    EXAM:  There were no vitals taken for this visit.   BMI: There is no height or weight on file to calculate BMI.    PHYSICAL EXAM:  Constitutional:  Appearance: Well nourished, well developed, alert, in no acute distress  Neck:  Lymph Nodes:  No lymphadenopathy present    Thyroid:  Gland size normal, nontender, no nodules or masses present  on palpation  Chest:  Respiratory Effort:  Breathing unlabored  Cardiovascular:    Heart: Auscultation:  Regular rate, normal rhythm, no murmurs present  Breasts: Inspection of Breasts:  No lymphadenopathy present., Palpation of Breasts and Axillae:  No masses present on palpation, no breast tenderness. and Axillary Lymph Nodes:  No lymphadenopathy present.  Gastrointestinal:   Abdominal Examination:  Abdomen nontender to palpation, tone normal without rigidity or guarding, no masses present, umbilicus without lesions   Liver and Spleen:  No hepatomegaly present, liver nontender to palpation    Hernias:  No hernias present  Lymphatic: Lymph Nodes:  No other lymphadenopathy present  Skin:  General Inspection:  No rashes present, no lesions present, no areas of  discoloration    Genitalia and Groin:  No rashes present, no lesions present, no areas of  discoloration, no masses present  Neurologic/Psychiatric:    Mental Status:  Oriented X3     Pelvic Exam:  External Genitalia:     Normal appearance for age, no discharge present, no tenderness present, no inflammatory lesions present, color normal  Vagina:     Normal vaginal vault without central or  paravaginal defects, no discharge present, no inflammatory lesions present, no masses present  Bladder:     Nontender to palpation  Urethra:   Urethral Body:  Urethra palpation normal, urethra structural support normal   Urethral Meatus:  No erythema or lesions present  Cervix:     Appearance healthy, no lesions present, nontender to palpation, no bleeding present  Uterus:     Uterus: firm, normal sized and nontender, anteverted in position.   Adnexa:     No adnexal tenderness present, no adnexal masses present  Perineum:     Perineum within normal limits, no evidence of trauma, no rashes or skin lesions present  Anus:     Anus within normal limits, no hemorrhoids present  Inguinal Lymph Nodes:     No lymphadenopathy present  Pubic Hair:     Normal pubic hair distribution for age  Genitalia and Groin:     No rashes present, no lesions present, no areas of discoloration, no masses present      COUNSELING:   Reviewed preventive health counseling, as reflected in patient instructions      ASSESSMENT:  24 year old female with satisfactory annual exam.    PLAN:  (Z01.419) Well woman exam with routine gynecological exam  (primary encounter diagnosis)  Comment: WNL   Plan: return to clinic 1 year   -Handouts given on IUD options, briefly introduced IUDs    (Z12.4) Cervical cancer screening  Comment: ordered   Plan: Pap imaged thin layer screen only - recommended        age 21 - 24 years, CANCELED: Pap imaged thin         layer screen only - recommended age 21 - 24         years            (Z11.3) Routine screening for STI (sexually transmitted infection)  Comment: ordered   Plan: Chlamydia trachomatis PCR, Neisseria         gonorrhoeae PCR        Results pending         CLAUDIO Sanders CNM

## 2019-09-18 LAB
C TRACH DNA SPEC QL NAA+PROBE: NEGATIVE
N GONORRHOEA DNA SPEC QL NAA+PROBE: NEGATIVE
SPECIMEN SOURCE: NORMAL
SPECIMEN SOURCE: NORMAL

## 2019-09-19 LAB
COPATH REPORT: NORMAL
PAP: NORMAL

## 2022-02-17 PROBLEM — Z72.89 OTHER PROBLEMS RELATED TO LIFESTYLE: Status: ACTIVE | Noted: 2018-03-29
